# Patient Record
Sex: FEMALE | Race: BLACK OR AFRICAN AMERICAN | NOT HISPANIC OR LATINO | Employment: UNEMPLOYED | URBAN - METROPOLITAN AREA
[De-identification: names, ages, dates, MRNs, and addresses within clinical notes are randomized per-mention and may not be internally consistent; named-entity substitution may affect disease eponyms.]

---

## 2017-10-17 ENCOUNTER — OFFICE VISIT (OUTPATIENT)
Dept: RETAIL CLINIC | Facility: CLINIC | Age: 32
End: 2017-10-17

## 2017-10-17 DIAGNOSIS — Z02.83 ENCOUNTER FOR DRUG SCREENING: Primary | ICD-10-CM

## 2020-12-09 ENCOUNTER — LAB REQUISITION (OUTPATIENT)
Dept: LAB | Facility: HOSPITAL | Age: 35
End: 2020-12-09

## 2020-12-09 DIAGNOSIS — Z00.00 ROUTINE GENERAL MEDICAL EXAMINATION AT A HEALTH CARE FACILITY: ICD-10-CM

## 2020-12-09 PROCEDURE — 86481 TB AG RESPONSE T-CELL SUSP: CPT

## 2020-12-13 LAB
TSPOT INTERPRETATION: NEGATIVE
TSPOT NIL CONTROL INTERPRETATION: NORMAL
TSPOT PANEL A: 1
TSPOT PANEL B: 0
TSPOT POS CONTROL INTERPRETATION: NORMAL

## 2021-03-03 ENCOUNTER — OFFICE VISIT (OUTPATIENT)
Dept: INTERNAL MEDICINE | Facility: CLINIC | Age: 36
End: 2021-03-03

## 2021-03-03 VITALS
HEART RATE: 91 BPM | OXYGEN SATURATION: 98 % | WEIGHT: 184 LBS | DIASTOLIC BLOOD PRESSURE: 80 MMHG | TEMPERATURE: 98.4 F | SYSTOLIC BLOOD PRESSURE: 116 MMHG

## 2021-03-03 DIAGNOSIS — K59.00 CONSTIPATION, UNSPECIFIED CONSTIPATION TYPE: Primary | ICD-10-CM

## 2021-03-03 DIAGNOSIS — Z71.3 DIETARY COUNSELING: ICD-10-CM

## 2021-03-03 DIAGNOSIS — R10.84 GENERALIZED ABDOMINAL PAIN: ICD-10-CM

## 2021-03-03 DIAGNOSIS — Z12.4 CERVICAL CANCER SCREENING: ICD-10-CM

## 2021-03-03 PROCEDURE — 99204 OFFICE O/P NEW MOD 45 MIN: CPT | Performed by: PHYSICIAN ASSISTANT

## 2021-03-03 RX ORDER — FAMOTIDINE 20 MG/1
20 TABLET, FILM COATED ORAL DAILY
Qty: 60 TABLET | Refills: 1 | Status: SHIPPED | OUTPATIENT
Start: 2021-03-03

## 2021-03-03 RX ORDER — IBUPROFEN 600 MG/1
TABLET ORAL
COMMUNITY
Start: 2020-12-01

## 2021-03-03 RX ORDER — FAMOTIDINE 20 MG/1
20 TABLET, FILM COATED ORAL DAILY
COMMUNITY
Start: 2021-02-09 | End: 2021-03-03 | Stop reason: SDUPTHER

## 2021-03-03 RX ORDER — MAG HYDROX/ALUMINUM HYD/SIMETH 400-400-40
1 SUSPENSION, ORAL (FINAL DOSE FORM) ORAL AS NEEDED
Qty: 10 EACH | Refills: 0 | Status: SHIPPED | OUTPATIENT
Start: 2021-03-03

## 2021-03-03 RX ORDER — DOCUSATE SODIUM 100 MG/1
100 CAPSULE, LIQUID FILLED ORAL 2 TIMES DAILY
Qty: 60 CAPSULE | Refills: 1 | Status: SHIPPED | OUTPATIENT
Start: 2021-03-03

## 2021-03-03 RX ORDER — POLYETHYLENE GLYCOL 3350 17 G/17G
POWDER, FOR SOLUTION ORAL
COMMUNITY
Start: 2021-02-09

## 2021-03-03 RX ORDER — HYDROCORTISONE 1 G/100G
CREAM TOPICAL
COMMUNITY
Start: 2021-02-09

## 2021-03-03 NOTE — PROGRESS NOTES
Chief Complaint  Abdominal Pain, Constipation, and tubes tied (had tubes tied 6-7 years ago and would like to check everything is okay )     services were utilized for the duration of the visit.    Subjective          History of Present Illness  Kat Raya presents to [facility] to establish care.  Abd Pain:  Has had stomach pain for years, ever since she had her tubes tied 6-7 years ago.  She has occ nausea but no vomiting. Thinks it may be related to constipation. She is also interested in discussing reversal of tubal.  Does not have a gyn that she is currently followed by. She has indigestion at times.     Constipation:  Has chronic constipation and will have a BM once every 5-6 days.  Feels like American diet has made her constipation worse. She does not have bleeding with BMs. No diarrhea. Has not had imaging on her stomach. No fevers. She thinks some of the stomach pain is related to constipation. Does feel relief with BMs.      Review of Systems   Constitutional: Negative for fatigue, fever and unexpected weight loss.   Respiratory: Negative for cough, shortness of breath and wheezing.    Cardiovascular: Negative for chest pain and palpitations.   Gastrointestinal: Positive for abdominal pain, constipation, nausea and indigestion. Negative for diarrhea and vomiting.       The following portions of the patient's history were reviewed and updated as appropriate: allergies, current medications, past family history, past medical history, past social history, past surgical history and problem list.    No Known Allergies  Current Outpatient Medications on File Prior to Visit   Medication Sig Dispense Refill   • Hydrocortisone Intensive Heal 1 % cream APPLY SMALL AMOUNT TO AFFECTED AREA TWICE A DAY AS NEEDED FOR ITCHING     • ibuprofen (ADVIL,MOTRIN) 600 MG tablet TK 1 T PO UP TO TID PRF PAIN     • polyethylene glycol (MIRALAX) 17 GM/SCOOP powder MIX 1 CAPFUL IN 8  OUNCES OF LIQUID AS NEEDED FOR CONSTIPATION       No current facility-administered medications on file prior to visit.     History reviewed. No pertinent past medical history.   Past Surgical History:   Procedure Laterality Date   • TUBAL ABDOMINAL LIGATION        History reviewed. No pertinent family history.   Social History     Socioeconomic History   • Marital status:      Spouse name: Not on file   • Number of children: Not on file   • Years of education: Not on file   • Highest education level: Not on file   Tobacco Use   • Smoking status: Never Smoker   • Smokeless tobacco: Never Used   Substance and Sexual Activity   • Alcohol use: Never        Objective   Vital Signs:   Vitals:    03/03/21 1616   BP: 116/80   Pulse: 91   Temp: 98.4 °F (36.9 °C)   TempSrc: Temporal   SpO2: 98%   Weight: 83.5 kg (184 lb)      There is no height or weight on file to calculate BMI.  Physical Exam  Vitals reviewed.   Constitutional:       General: She is not in acute distress.     Appearance: Normal appearance.   HENT:      Head: Normocephalic and atraumatic.   Eyes:      General: No scleral icterus.     Extraocular Movements: Extraocular movements intact.      Conjunctiva/sclera: Conjunctivae normal.      Pupils: Pupils are equal, round, and reactive to light.   Cardiovascular:      Rate and Rhythm: Normal rate and regular rhythm.      Heart sounds: Normal heart sounds. No murmur.   Pulmonary:      Effort: Pulmonary effort is normal. No respiratory distress.      Breath sounds: Normal breath sounds. No stridor. No wheezing or rhonchi.   Abdominal:      General: Bowel sounds are normal. There is no distension.      Palpations: Abdomen is soft. There is no mass.      Tenderness: There is abdominal tenderness.      Comments: Mild generalized abd discomfort w/exam   Musculoskeletal:      Cervical back: Normal range of motion and neck supple.   Skin:     General: Skin is warm and dry.      Coloration: Skin is not jaundiced.    Neurological:      General: No focal deficit present.      Mental Status: She is alert and oriented to person, place, and time.      Gait: Gait normal.   Psychiatric:         Mood and Affect: Mood normal.         Behavior: Behavior normal.          Result Review :                   Assessment and Plan    Diagnoses and all orders for this visit:    1. Constipation, unspecified constipation type (Primary)  Assessment & Plan:  Adv use miralax and colace daily to promote soft regular stools, can use glycerin supp if has no BM for several days. F/u in 1 mo or sooner if sx worsen. Check xray abd.    Orders:  -     XR Abdomen KUB; Future  -     glycerin adult 2 g suppository; Insert 1 suppository into the rectum As Needed for Constipation. Use if no BM for 3 days.  Dispense: 10 each; Refill: 0  -     docusate sodium (Colace) 100 MG capsule; Take 1 capsule by mouth 2 (Two) Times a Day.  Dispense: 60 capsule; Refill: 1    2. Generalized abdominal pain  -     XR Abdomen KUB; Future  -     famotidine (PEPCID) 20 MG tablet; Take 1 tablet by mouth Daily.  Dispense: 60 tablet; Refill: 1    3. Cervical cancer screening  -     Ambulatory Referral to Gynecology    4. Dietary counseling        Follow Up   Return in about 3 weeks (around 3/24/2021).    Follow up if symptoms worsen or persist or has new or concerning symptoms, go to ER for severe symptoms.   Reviewed common medication effects and side effects and to report side effects immediately, the patient expressed good understanding.  Encouraged medication compliance and the importance of keeping scheduled follow up appointments with me and any other providers  If labs or images are ordered we will contact you with the results within the next week.  If you have not heard from us after a week please call our office to inquire about the results.   Patient was given instructions and counseling regarding her condition or for health maintenance advice. Please see specific  information pulled into the AVS if appropriate.     Josie Lyons PA-C    * Please note that portions of this note may have been completed with a voice recognition program. Efforts were made to edit the dictation but occasionally words are erroneously transcribed.

## 2021-03-05 ENCOUNTER — HOSPITAL ENCOUNTER (OUTPATIENT)
Dept: GENERAL RADIOLOGY | Facility: HOSPITAL | Age: 36
Discharge: HOME | End: 2021-03-05
Admitting: PHYSICIAN ASSISTANT

## 2021-03-05 DIAGNOSIS — K59.00 CONSTIPATION, UNSPECIFIED CONSTIPATION TYPE: ICD-10-CM

## 2021-03-05 DIAGNOSIS — R10.84 GENERALIZED ABDOMINAL PAIN: ICD-10-CM

## 2021-03-05 PROCEDURE — 74018 RADEX ABDOMEN 1 VIEW: CPT

## 2021-03-07 PROBLEM — K59.00 CONSTIPATION: Status: ACTIVE | Noted: 2021-03-07

## 2021-03-07 NOTE — ASSESSMENT & PLAN NOTE
Adv use miralax and colace daily to promote soft regular stools, can use glycerin supp if has no BM for several days. F/u in 1 mo or sooner if sx worsen. Check xray abd.

## 2021-03-09 ENCOUNTER — TELEPHONE (OUTPATIENT)
Dept: INTERNAL MEDICINE | Facility: CLINIC | Age: 36
End: 2021-03-09

## 2021-03-09 NOTE — TELEPHONE ENCOUNTER
----- Message from Josie Lyons PA-C sent at 3/6/2021  2:27 PM EST -----  Please let pt know (w/) that her abdominal xray was normal/good.

## 2021-03-09 NOTE — TELEPHONE ENCOUNTER
PN of results and had some other questions.  We had a bad connection and I let her know it would be good to schedule an appointment with the provider to go over her questions.

## 2022-03-28 ENCOUNTER — TELEPHONE (OUTPATIENT)
Dept: INTERNAL MEDICINE | Facility: CLINIC | Age: 37
End: 2022-03-28